# Patient Record
Sex: MALE
[De-identification: names, ages, dates, MRNs, and addresses within clinical notes are randomized per-mention and may not be internally consistent; named-entity substitution may affect disease eponyms.]

---

## 2019-04-22 PROBLEM — Z00.00 ENCOUNTER FOR PREVENTIVE HEALTH EXAMINATION: Status: ACTIVE | Noted: 2019-04-22

## 2019-04-25 ENCOUNTER — APPOINTMENT (OUTPATIENT)
Dept: OTOLARYNGOLOGY | Facility: CLINIC | Age: 24
End: 2019-04-25
Payer: COMMERCIAL

## 2019-04-25 VITALS
BODY MASS INDEX: 21.87 KG/M2 | HEART RATE: 70 BPM | WEIGHT: 165 LBS | HEIGHT: 73 IN | DIASTOLIC BLOOD PRESSURE: 69 MMHG | SYSTOLIC BLOOD PRESSURE: 119 MMHG

## 2019-04-25 DIAGNOSIS — Z82.49 FAMILY HISTORY OF ISCHEMIC HEART DISEASE AND OTHER DISEASES OF THE CIRCULATORY SYSTEM: ICD-10-CM

## 2019-04-25 DIAGNOSIS — Z78.9 OTHER SPECIFIED HEALTH STATUS: ICD-10-CM

## 2019-04-25 DIAGNOSIS — Z80.9 FAMILY HISTORY OF MALIGNANT NEOPLASM, UNSPECIFIED: ICD-10-CM

## 2019-04-25 DIAGNOSIS — R06.00 DYSPNEA, UNSPECIFIED: ICD-10-CM

## 2019-04-25 PROCEDURE — 31575 DIAGNOSTIC LARYNGOSCOPY: CPT

## 2019-04-25 PROCEDURE — 99204 OFFICE O/P NEW MOD 45 MIN: CPT | Mod: 25

## 2019-04-27 RX ORDER — ALPRAZOLAM 0.25 MG/1
0.25 TABLET ORAL
Qty: 10 | Refills: 0 | Status: ACTIVE | COMMUNITY
Start: 2019-04-27 | End: 1900-01-01

## 2019-04-29 PROBLEM — Z80.9 FAMILY HISTORY OF MALIGNANT NEOPLASM: Status: ACTIVE | Noted: 2019-04-25

## 2019-04-29 PROBLEM — Z82.49 FAMILY HISTORY OF HYPERTENSION: Status: ACTIVE | Noted: 2019-04-25

## 2019-04-29 PROBLEM — Z78.9 CONSUMES ALCOHOL: Status: ACTIVE | Noted: 2019-04-25

## 2019-04-29 PROBLEM — R06.00 DYSPNEA, UNSPECIFIED: Status: ACTIVE | Noted: 2019-04-29

## 2019-04-29 RX ORDER — ASPIRIN 81 MG
81 TABLET, DELAYED RELEASE (ENTERIC COATED) ORAL
Refills: 0 | Status: ACTIVE | COMMUNITY

## 2019-04-29 NOTE — ASSESSMENT
[FreeTextEntry1] : 23M here for initial evaluation. Over the past 7 years or so, he has had intermittent episodes of sensation of tightness of his neck muscles followed by difficulty breathing in with shortness of breath. These episodes have been occurring more frequently and he wants to ensure his airway is not obstructed. When he feels the above sx, they are purely inspiratory and he feels as if his airway is narrowed and this gives substantial anxiety. These episodes can last minutes to hours; lying down and sleeping make them resolve. Running/activity makes it better as does drinking etoh. There is no difficulty eating, breathing, swallowing or talking.He has gone to the ED several times for these episodes, but has never been intubated. 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 He is awaiting genetic testing. Complete and comprehensive head and neck exam, including flexible fiberoptic laryngoscopy, is unremarkable.\par I am not sure as to the etiology of his sx. The fact he may have a congenital myotonia is certainly noteworthy and cannot be overlooked, however, on cursory reading, it usually does not present with his constellation of sx (but rather more like how his sisters presented). Additionally, his history is also c/w paradoxic vocal fold motion/laryngospasm, where in between episodes, his exam is completely normal, just like today. I will treat for the latter first, which really just includes mild anxiolysis (xanax 0.25mg). I also encouraged him to get his genetic testing and continue close f/u with his PCP and neurologist. He will let me know how he is doing over the next few weeks.\par

## 2019-04-29 NOTE — HISTORY OF PRESENT ILLNESS
[de-identified] : 23M here for initial evaluation.\par \par Over the past 7 years or so, he has had intermittent episodes of sensation of tightness of his neck muscles followed by difficulty breathing in with shortness of breath. These episodes have been occurring more frequently and he wants to ensure his airway is not obstructed.\par When he feels the above sx, they are purely inspiratory and he feels as if his airway is narrowed and this gives substantial anxiety. These episodes can last minutes to hours; lying down and sleeping make them resolve. He has gone to the ED several times for these episodes, but has never been intubated. 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 He is awaitin genetic testing.\par \par ROS otherwise unremarkable.

## 2019-04-29 NOTE — CONSULT LETTER
[Dear  ___] : Dear  [unfilled], [Courtesy Letter:] : I had the pleasure of seeing your patient, [unfilled], in my office today. [Consult Closing:] : Thank you very much for allowing me to participate in the care of this patient.  If you have any questions, please do not hesitate to contact me. [Sincerely,] : Sincerely, [DrTerry  ___] : Dr. BAUER [DrTerry ___] : Dr. BAUER [FreeTextEntry3] : James Cordero MD\par Department of Otolaryngology - Head and Neck Surgery\par [facility]

## 2019-04-29 NOTE — PHYSICAL EXAM
[Nasal Endoscopy Performed] : nasal endoscopy was performed, see procedure section for findings [Midline] : trachea located in midline position [Normal] : no rashes [de-identified] : mild pharyngeal erythema, no drainage

## 2019-09-27 ENCOUNTER — APPOINTMENT (OUTPATIENT)
Dept: OTOLARYNGOLOGY | Facility: CLINIC | Age: 24
End: 2019-09-27

## 2024-04-09 ENCOUNTER — NON-APPOINTMENT (OUTPATIENT)
Age: 29
End: 2024-04-09